# Patient Record
Sex: MALE
[De-identification: names, ages, dates, MRNs, and addresses within clinical notes are randomized per-mention and may not be internally consistent; named-entity substitution may affect disease eponyms.]

---

## 2020-08-13 ENCOUNTER — APPOINTMENT (OUTPATIENT)
Dept: DERMATOLOGY | Facility: HOSPITAL | Age: 20
End: 2020-08-13

## 2020-08-13 PROBLEM — Z00.00 ENCOUNTER FOR PREVENTIVE HEALTH EXAMINATION: Status: ACTIVE | Noted: 2020-08-13

## 2023-12-03 ENCOUNTER — EMERGENCY (EMERGENCY)
Facility: HOSPITAL | Age: 23
LOS: 1 days | Discharge: HOME | End: 2023-12-03
Attending: EMERGENCY MEDICINE | Admitting: EMERGENCY MEDICINE
Payer: SELF-PAY

## 2023-12-03 VITALS
HEART RATE: 65 BPM | TEMPERATURE: 98 F | WEIGHT: 157.19 LBS | DIASTOLIC BLOOD PRESSURE: 51 MMHG | HEIGHT: 66 IN | SYSTOLIC BLOOD PRESSURE: 107 MMHG | RESPIRATION RATE: 17 BRPM | OXYGEN SATURATION: 98 %

## 2023-12-03 PROCEDURE — 99283 EMERGENCY DEPT VISIT LOW MDM: CPT

## 2023-12-03 PROCEDURE — 99284 EMERGENCY DEPT VISIT MOD MDM: CPT

## 2023-12-03 RX ADMIN — Medication 60 MILLIGRAM(S): at 01:06

## 2023-12-03 NOTE — ED PROVIDER NOTE - CLINICAL SUMMARY MEDICAL DECISION MAKING FREE TEXT BOX
same name as above Patient patient with urticarial rash on trunk and arms.  Uncertain etiology.  No vesicles.  We will start steroids and refer to allergist.

## 2023-12-03 NOTE — ED ADULT TRIAGE NOTE - CHIEF COMPLAINT QUOTE
I work [employer] and I noticed yesterday I have a generalized rash; it is itchy; I thought it was poison ivy I work [employer] and I noticed yesterday I have a generalized rash; it is itchy; I thought it was poison ivy I work [employer] and I noticed yesterday I have a generalized rash; it is itchy; I thought it was poison ivy

## 2023-12-03 NOTE — ED PROVIDER NOTE - NSFOLLOWUPINSTRUCTIONS_ED_ALL_ED_FT
Urticaria    WHAT YOU NEED TO KNOW:    What is urticaria? Urticaria is also called hives. Hives can change size and shape, and appear anywhere on your skin. They can be mild or severe and last from a few minutes to a few days. Hives may be a sign of a severe allergic reaction called anaphylaxis that needs immediate treatment. Urticaria that lasts longer than 6 weeks may be a chronic condition that needs long-term treatment.  Hives    What causes urticaria? Hives are caused by an immune system reaction. The following are common triggers:    Food allergies, such as to nuts, eggs, or shellfish    Food dyes, additives, or preservatives    Medicine allergies, such as to ibuprofen or antibiotics    Infections, such as a cold or mono    Bug bites    Pets, plants, or latex    Stress  How is the cause of urticaria diagnosed? Your healthcare provider will examine you and ask about your symptoms. He or she may also ask about your family medical history, medicines you take, and foods you eat. Tell your provider about any recent trauma, stress, or contact with allergens. You may need additional testing if you developed anaphylaxis after you were exposed to a trigger and then exercised. This is called exercise-induced anaphylaxis. You may need any of the following:    A skin test is used to see how your skin reacts to possible triggers. Your provider will put a small amount of the trigger onto your skin. He or she will cover the area with a patch that stays on for 2 days. Then he or she will check your skin for a reaction.    A challenge test is used to give you increasing doses of what may be causing your hives. Your provider will watch for a reaction.  How is urticaria treated? Hives often go away without treatment. Chronic urticaria may need to be treated with more than one medicine, or other medicines than listed below. The following are common medicines used to treat urticaria:    Antihistamines decrease mild symptoms such as itching or a rash.    Steroids decrease redness, pain, and swelling.    Epinephrine is used to treat severe allergic reactions such as anaphylaxis.  What steps do I need to take for signs or symptoms of anaphylaxis?    Immediately give 1 shot of epinephrine only into the outer thigh muscle.  How to Give An Epinephrine Shot Adult      Leave the shot in place as directed. Your provider may recommend you leave it in place for up to 10 seconds before you remove it. This helps make sure all of the epinephrine is delivered.    Call 911 and go to the emergency department, even if the shot improved symptoms. Do not drive yourself. Bring the used epinephrine shot with you.  What safety precautions do I need to take if I am at risk for anaphylaxis?    Keep 2 shots of epinephrine with you at all times. You may need a second shot, because epinephrine only works for about 20 minutes and symptoms may return. Your provider can show you and family members how to give the shot. Check the expiration date every month and replace it before it expires.    Create an action plan. Your provider can help you create a written plan that explains the allergy and an emergency plan to treat a reaction. The plan explains when to give a second epinephrine shot if symptoms return or do not improve after the first. Give copies of the action plan and emergency instructions to family members, work and school staff, and  providers. Show them how to give a shot of epinephrine.    Be careful when you exercise. If you have had exercise-induced anaphylaxis, do not exercise right after you eat. Stop exercising right away if you start to develop any signs or symptoms of anaphylaxis. You may first feel tired, warm, or have itchy skin. Hives, swelling, and severe breathing problems may develop if you continue to exercise.    Carry medical alert identification. Wear medical alert jewelry or carry a card that explains the allergy. Ask your provider where to get these items.  Medical Alert Jewelry      Keep a record of triggers and symptoms. Record everything you eat, drink, or apply to your skin for 3 weeks. Include stressful events and what you were doing right before your hives started. Bring the record with you to follow-up visits with your provider.  What can I do to manage urticaria?    Cool your skin. This may help decrease itching. Apply a cool pack to your hives. Dip a hand towel in cool water, wring it out, and place it on your hives. You may also soak your skin in a cool oatmeal bath.    Do not rub your hives. This can irritate your skin and cause more hives.    Wear loose clothing. Tight clothes may irritate your skin and cause more hives.    Manage stress. Stress may trigger hives, or make them worse. Learn new ways to relax, such as deep breathing.  Call your local emergency number (911 in the [place]) for signs or symptoms of anaphylaxis, such as trouble breathing, swelling in your mouth or throat, or wheezing. You may also have itching, a rash, or feel like you are going to faint.    When should I seek immediate care?    Your heart is beating faster than it normally does.    You have cramping or severe pain in your abdomen.  When should I call my doctor?    You have a fever.    Your skin still itches 24 hours after you take your medicine.    You still have hives after 7 days.    Your joints are painful and swollen.    You have questions or concerns about your condition or care.  CARE AGREEMENT:    You have the right to help plan your care. Learn about your health condition and how it may be treated. Discuss treatment options with your healthcare providers to decide what care you want to receive. You always have the right to refuse treatment.

## 2023-12-03 NOTE — ED ADULT NURSE NOTE - OBJECTIVE STATEMENT
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

## 2023-12-03 NOTE — ED ADULT NURSE NOTE - CHIEF COMPLAINT QUOTE
I work [employer] and I noticed yesterday I have a generalized rash; it is itchy; I thought it was poison ivy I work [employer] and I noticed yesterday I have a generalized rash; it is itchy; I thought it was poison ivy I work [email or web address] and I noticed yesterday I have a generalized rash; it is itchy; I thought it was poison ivy

## 2023-12-03 NOTE — ED PROVIDER NOTE - OBJECTIVE STATEMENT
Patient presents with itchy rash since yesterday.  Patient is a  and is unsure if he came in contact with any poison ivy.  No other new exposures known.  Patient has been using topical medications for the itch.  No difficulty breathing.  No difficulty speaking or swallowing.  Patient has no known allergies.

## 2023-12-03 NOTE — ED PROVIDER NOTE - PATIENT PORTAL LINK FT
You can access the FollowMyHealth Patient Portal offered by [facility] by registering at the following website: [email or web address]. By joining [employer]’s FollowMyHealth portal, you will also be able to view your health information using other applications (apps) compatible with our system. You can access the FollowMyHealth Patient Portal offered by [facility] by registering at the following website: [email or web address]. By joining [employer]’s FollowMyHealth portal, you will also be able to view your health information using other applications (apps) compatible with our system. You can access the FollowMyHealth Patient Portal offered by [facility] by registering at the following website: [email or web address]. By joining [employer]’s FollowMyHealth portal, you will also be able to view your health information using other applications (apps) compatible with our system.

## 2023-12-03 NOTE — ED PROVIDER NOTE - CARE PROVIDER_API CALL
Norman Brooke  Allergy and Immunology  [address], [address]  [place] [address]  Phone: [phone]  Fax: [phone]  Follow Up Time:    Norman Brooke  Allergy and Immunology  [address], [address]  [place] [address]  Phone: [phone]  Fax: [phone]  Follow Up Time:    Norman Brooke  Allergy and Immunology  [address], [address]  [place] [address]  Phone: [phone]  Fax: [phone]  Follow Up Time: